# Patient Record
Sex: FEMALE | Race: WHITE | HISPANIC OR LATINO | Employment: STUDENT | URBAN - METROPOLITAN AREA
[De-identification: names, ages, dates, MRNs, and addresses within clinical notes are randomized per-mention and may not be internally consistent; named-entity substitution may affect disease eponyms.]

---
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
## 2023-08-22 ENCOUNTER — OFFICE VISIT (OUTPATIENT)
Dept: URGENT CARE | Facility: CLINIC | Age: 18
End: 2023-08-22
Payer: COMMERCIAL

## 2023-08-22 VITALS
SYSTOLIC BLOOD PRESSURE: 126 MMHG | WEIGHT: 155 LBS | OXYGEN SATURATION: 98 % | BODY MASS INDEX: 24.91 KG/M2 | HEART RATE: 80 BPM | TEMPERATURE: 98 F | DIASTOLIC BLOOD PRESSURE: 80 MMHG | RESPIRATION RATE: 18 BRPM | HEIGHT: 66 IN

## 2023-08-22 DIAGNOSIS — J02.9 SORE THROAT: Primary | ICD-10-CM

## 2023-08-22 DIAGNOSIS — R09.81 NASAL CONGESTION: ICD-10-CM

## 2023-08-22 LAB
CTP QC/QA: YES
SARS-COV-2 AG RESP QL IA.RAPID: NEGATIVE

## 2023-08-22 PROCEDURE — 99213 OFFICE O/P EST LOW 20 MIN: CPT | Mod: S$GLB,,, | Performed by: NURSE PRACTITIONER

## 2023-08-22 PROCEDURE — 87811 SARS-COV-2 COVID19 W/OPTIC: CPT | Mod: QW,S$GLB,, | Performed by: NURSE PRACTITIONER

## 2023-08-22 PROCEDURE — 99213 PR OFFICE/OUTPT VISIT, EST, LEVL III, 20-29 MIN: ICD-10-PCS | Mod: S$GLB,,, | Performed by: NURSE PRACTITIONER

## 2023-08-22 PROCEDURE — 87811 SARS CORONAVIRUS 2 ANTIGEN POCT, MANUAL READ: ICD-10-PCS | Mod: QW,S$GLB,, | Performed by: NURSE PRACTITIONER

## 2023-08-22 RX ORDER — NETARSUDIL 0.2 MG/ML
1 SOLUTION/ DROPS OPHTHALMIC; TOPICAL NIGHTLY
COMMUNITY
Start: 2022-08-29

## 2023-08-22 RX ORDER — DORZOLAMIDE HYDROCHLORIDE AND TIMOLOL MALEATE 20; 5 MG/ML; MG/ML
1 SOLUTION/ DROPS OPHTHALMIC 2 TIMES DAILY
COMMUNITY
Start: 2023-08-08

## 2023-08-22 RX ORDER — DORZOLAMIDE HYDROCHLORIDE AND TIMOLOL MALEATE 20; 5 MG/ML; MG/ML
SOLUTION/ DROPS OPHTHALMIC
COMMUNITY
Start: 2022-09-21

## 2023-08-22 NOTE — PATIENT INSTRUCTIONS
Drink plenty of fluids  Rest.   Elevate head of bed when sleeping, use a humidifier (or a steamy shower) and use normal saline in the nasal passages to help with nasal congestion and cough.   For sore throat- avoid acidic/spicy foods   Gargle with warm salt water  Wear a mask around others may reduce the spread of infections to others  Wash hands frequently or use hand     Medications:  Fever and pain Ibuprofen (Advil or Motrin) and/or Acetaminophen (Tylenol) please read the packages for instructions  Cough  Guaifenesin (Mucinex) is an expectorant, Dextromethropan (DM) is a cough suppressant, or cough syrups of your choice.  If you were prescribed a prescription cough medication today only use as directed.   Congestion Flonase nasal spray. Please use the package for instructions.    Sore throat  Cepacol lozenges, Chloraseptic spray, warm salt water gargles.  The cough may linger for weeks.  Cough is our bodies defense mechanism to move mucus around to prevent us from getting pneumonia.  We can't totally take the cough away.       Follow up if:  Symptoms not improved in 14 days  Fever for longer than 3 days  Cough last longer than 10 days  Increased tiredness or weakness  If you are having difficulty breathing.  (If severe call 911 or go to nearest ER)

## 2023-08-22 NOTE — PROGRESS NOTES
"Subjective:      Patient ID: Ruth Alvarado is a 18 y.o. female.    Vitals:  height is 5' 6" (1.676 m) and weight is 70.3 kg (155 lb).     Chief Complaint: Nasal Congestion    Patient presents a sore throat and body aches that started on Sunday.    Sore Throat   This is a new problem. The current episode started in the past 7 days. There has been no fever. The pain is at a severity of 0/10. Associated symptoms include congestion. Pertinent negatives include no trouble swallowing.       HENT:  Positive for congestion and sore throat. Negative for trouble swallowing.       Pt informed provider that her room mate is positive.  They don't spend much time together but she wanted to be safe and get checked for COVID.   Objective:     Physical Exam   Constitutional: She is oriented to person, place, and time.  Non-toxic appearance. She does not appear ill. No distress.   HENT:   Head: Normocephalic and atraumatic.   Ears:   Right Ear: Tympanic membrane normal.   Left Ear: Tympanic membrane normal.      Comments: Maritza fluid noted behind left TM   Nose: No congestion.   Mouth/Throat: Mucous membranes are moist. Oropharynx is clear.   Eyes: Conjunctivae are normal. Pupils are equal, round, and reactive to light. Extraocular movement intact   Cardiovascular: Normal rate, regular rhythm, normal heart sounds and normal pulses.   Pulmonary/Chest: Effort normal and breath sounds normal. No respiratory distress. She has no wheezes.   Abdominal: Normal appearance. There is no abdominal tenderness.   Musculoskeletal:      Right lower leg: No edema.      Left lower leg: No edema.   Lymphadenopathy:     She has cervical adenopathy.        Right cervical: Superficial cervical adenopathy present.   Neurological: no focal deficit. She is alert and oriented to person, place, and time.   Skin: Skin is not diaphoretic.   Psychiatric: Her behavior is normal. Mood normal.   Nursing note and vitals reviewed.    Pt states that she gets " a lot of ear infections in her left ear.  We will try to drain it first with OTC meds.  Since she is not c/o ear pain at this time we will not treat with antibiotics.   Assessment:     1. Sore throat  - SARS Coronavirus 2 Antigen, POCT Manual Read    2. Nasal congestion  - SARS Coronavirus 2 Antigen, POCT Manual Read     Results for orders placed or performed in visit on 08/22/23   SARS Coronavirus 2 Antigen, POCT Manual Read   Result Value Ref Range    SARS Coronavirus 2 Antigen Negative Negative     Acceptable Yes        Plan:     Patient Instructions   Drink plenty of fluids  Rest.   Elevate head of bed when sleeping, use a humidifier (or a steamy shower) and use normal saline in the nasal passages to help with nasal congestion and cough.   For sore throat- avoid acidic/spicy foods   Gargle with warm salt water  Wear a mask around others may reduce the spread of infections to others  Wash hands frequently or use hand     Medications:  Fever and pain Ibuprofen (Advil or Motrin) and/or Acetaminophen (Tylenol) please read the packages for instructions  Cough  Guaifenesin (Mucinex) is an expectorant, Dextromethropan (DM) is a cough suppressant, or cough syrups of your choice.  If you were prescribed a prescription cough medication today only use as directed.   Congestion Flonase nasal spray. Please use the package for instructions.    Sore throat  Cepacol lozenges, Chloraseptic spray, warm salt water gargles.  The cough may linger for weeks.  Cough is our bodies defense mechanism to move mucus around to prevent us from getting pneumonia.  We can't totally take the cough away.       Follow up if:  Symptoms not improved in 14 days  Fever for longer than 3 days  Cough last longer than 10 days  Increased tiredness or weakness  If you are having difficulty breathing.  (If severe call 911 or go to nearest ER)

## 2023-08-29 ENCOUNTER — OFFICE VISIT (OUTPATIENT)
Dept: URGENT CARE | Facility: CLINIC | Age: 18
End: 2023-08-29
Payer: COMMERCIAL

## 2023-08-29 VITALS
SYSTOLIC BLOOD PRESSURE: 116 MMHG | OXYGEN SATURATION: 98 % | RESPIRATION RATE: 18 BRPM | BODY MASS INDEX: 24.91 KG/M2 | HEIGHT: 66 IN | TEMPERATURE: 99 F | HEART RATE: 73 BPM | WEIGHT: 155 LBS | DIASTOLIC BLOOD PRESSURE: 79 MMHG

## 2023-08-29 DIAGNOSIS — J02.9 SORE THROAT: Primary | ICD-10-CM

## 2023-08-29 DIAGNOSIS — R10.9 PAIN IN ABDOMEN ON PALPATION: ICD-10-CM

## 2023-08-29 DIAGNOSIS — R68.83 CHILLS (WITHOUT FEVER): ICD-10-CM

## 2023-08-29 DIAGNOSIS — R19.7 DIARRHEA, UNSPECIFIED TYPE: ICD-10-CM

## 2023-08-29 DIAGNOSIS — R09.81 NASAL CONGESTION: ICD-10-CM

## 2023-08-29 LAB
CTP QC/QA: YES
SARS-COV-2 AG RESP QL IA.RAPID: NEGATIVE

## 2023-08-29 PROCEDURE — 99213 OFFICE O/P EST LOW 20 MIN: CPT | Mod: S$GLB,,, | Performed by: NURSE PRACTITIONER

## 2023-08-29 PROCEDURE — 87811 SARS-COV-2 COVID19 W/OPTIC: CPT | Mod: QW,S$GLB,, | Performed by: NURSE PRACTITIONER

## 2023-08-29 PROCEDURE — 87811 SARS CORONAVIRUS 2 ANTIGEN POCT, MANUAL READ: ICD-10-PCS | Mod: QW,S$GLB,, | Performed by: NURSE PRACTITIONER

## 2023-08-29 PROCEDURE — 99213 PR OFFICE/OUTPT VISIT, EST, LEVL III, 20-29 MIN: ICD-10-PCS | Mod: S$GLB,,, | Performed by: NURSE PRACTITIONER

## 2023-08-29 NOTE — PROGRESS NOTES
"Subjective:      Patient ID: Ruth Alvarado is a 18 y.o. female.    Vitals:  height is 5' 6" (1.676 m) and weight is 70.3 kg (155 lb). Her temperature is 99.4 °F (37.4 °C). Her blood pressure is 116/79 and her pulse is 73. Her respiration is 18 and oxygen saturation is 98%.     Chief Complaint: Sore Throat    Patient presents sore throat and stomachache that started Saturday. Patient was exposed to covid Thursday.     Sore Throat   This is a new problem. The current episode started in the past 7 days. There has been no fever. Associated symptoms include congestion, diarrhea and headaches. Pertinent negatives include no coughing or trouble swallowing. She has had no exposure to strep. She has tried cool liquids for the symptoms. The treatment provided no relief.     HENT:  Positive for congestion and sore throat. Negative for trouble swallowing.    Respiratory:  Negative for cough.    Gastrointestinal:  Positive for diarrhea.   Neurological:  Positive for headaches.      Pt states her entire friend group has COVID, shared drink with one of them.  Thought she had food poisoning all weekend. Chills most consistent symptom.  Diarrhea stools approx 6 a day since Saturday.   Objective:     Physical Exam   Constitutional: She is oriented to person, place, and time.  Non-toxic appearance. She does not appear ill. No distress.   HENT:   Head: Normocephalic and atraumatic.   Ears:   Right Ear: Tympanic membrane normal.   Left Ear: Tympanic membrane normal.   Nose: No congestion.   Mouth/Throat: Mucous membranes are moist. Oropharynx is clear.   Eyes: Conjunctivae are normal. Pupils are equal, round, and reactive to light. Extraocular movement intact   Cardiovascular: Normal rate, regular rhythm, normal heart sounds and normal pulses.   Pulmonary/Chest: Effort normal and breath sounds normal. No respiratory distress. She has no wheezes.   Abdominal: Normal appearance and bowel sounds are normal. There is abdominal " tenderness. There is no left CVA tenderness and no right CVA tenderness.   Musculoskeletal: Normal range of motion.         General: Normal range of motion.      Right lower leg: No edema.      Left lower leg: No edema.   Lymphadenopathy:     She has cervical adenopathy.        Right cervical: Superficial cervical adenopathy present.        Left cervical: Superficial cervical adenopathy present.   Neurological: no focal deficit. She is alert and oriented to person, place, and time.   Skin: Skin is warm and not diaphoretic.   Psychiatric: Her behavior is normal. Mood normal.   Nursing note and vitals reviewed.    Pt was unable to void for u/a to r/o UTI for abdomen pain and diarrhea    Assessment:     1. Sore throat  - SARS Coronavirus 2 Antigen, POCT Manual Read    2. Chills (without fever)  - SARS Coronavirus 2 Antigen, POCT Manual Read  - POCT Urinalysis, Dipstick, Automated, W/O Scope    3. Diarrhea, unspecified type  - SARS Coronavirus 2 Antigen, POCT Manual Read  - POCT Urinalysis, Dipstick, Automated, W/O Scope    4. Nasal congestion  - SARS Coronavirus 2 Antigen, POCT Manual Read    5. Pain in abdomen on palpation  - POCT Urinalysis, Dipstick, Automated, W/O Scope        Results for orders placed or performed in visit on 08/29/23   SARS Coronavirus 2 Antigen, POCT Manual Read   Result Value Ref Range    SARS Coronavirus 2 Antigen Negative Negative     Acceptable Yes        Plan:     Pt states two more of her friends called while she was in room that they are positive for COVID.  Will treat patient as if she is positive. You are in isolation until the morning of Sept 4th.     Patient Instructions   Drink plenty of fluids  Rest.   If you have fever you may return to work or school when you are fever free for 24 hours without using fever reducing medication.  Elevate head of bed when sleeping, use a humidifier (or a steamy shower) and use normal saline in the nasal passages to help with nasal  congestion and cough.   For sore throat- avoid acidic/spicy foods   Gargle with warm salt water  Wear a mask around others may reduce the spread of infections to others  Wash hands frequently or use hand     Medications:  Fever and pain Ibuprofen (Advil or Motrin) and/or Acetaminophen (Tylenol) please read the packages for instructions  Cough  Guaifenesin (Mucinex) is an expectorant, Dextromethropan (DM) is a cough suppressant, or cough syrups of your choice.  If you were prescribed a prescription cough medication today only use as directed.   Congestion Flonase nasal spray. Please use the package for instructions.  Pseudoephedrine is a decongestant but if you have high blood pressure you can only use Coricidin.  Neti-pot with sterile water.   Sore throat  Cepacol lozenges, Chloraseptic spray, warm salt water gargles  Runny nose/Allergy symptoms  Allegra or Claritin  If you were prescribed an antibiotic today it could take several days before you start to feel better.  The cough may linger for weeks.  Cough is our bodies defense mechanism to move mucus around to prevent us from getting pneumonia.  We can't totally take the cough away.       Follow up if:  Symptoms not improved in 14 days  Fever for longer than 3 days  Cough last longer than 10 days  Increased tiredness or weakness  If you are having difficulty breathing.  (If severe call 911 or go to nearest ER)

## 2023-08-29 NOTE — PATIENT INSTRUCTIONS
Drink plenty of fluids  Rest.   If you have fever you may return to work or school when you are fever free for 24 hours without using fever reducing medication.  Elevate head of bed when sleeping, use a humidifier (or a steamy shower) and use normal saline in the nasal passages to help with nasal congestion and cough.   For sore throat- avoid acidic/spicy foods   Gargle with warm salt water  Wear a mask around others may reduce the spread of infections to others  Wash hands frequently or use hand     Medications:  Fever and pain Ibuprofen (Advil or Motrin) and/or Acetaminophen (Tylenol) please read the packages for instructions  Cough  Guaifenesin (Mucinex) is an expectorant, Dextromethropan (DM) is a cough suppressant, or cough syrups of your choice.  If you were prescribed a prescription cough medication today only use as directed.   Congestion Flonase nasal spray. Please use the package for instructions.  Pseudoephedrine is a decongestant but if you have high blood pressure you can only use Coricidin.  Neti-pot with sterile water.   Sore throat  Cepacol lozenges, Chloraseptic spray, warm salt water gargles  Runny nose/Allergy symptoms  Allegra or Claritin  If you were prescribed an antibiotic today it could take several days before you start to feel better.  The cough may linger for weeks.  Cough is our bodies defense mechanism to move mucus around to prevent us from getting pneumonia.  We can't totally take the cough away.       Follow up if:  Symptoms not improved in 14 days  Fever for longer than 3 days  Cough last longer than 10 days  Increased tiredness or weakness  If you are having difficulty breathing.  (If severe call 911 or go to nearest ER)

## 2023-10-28 ENCOUNTER — HOSPITAL ENCOUNTER (EMERGENCY)
Facility: HOSPITAL | Age: 18
Discharge: HOME | End: 2023-10-28
Attending: EMERGENCY MEDICINE
Payer: COMMERCIAL

## 2023-10-28 VITALS
RESPIRATION RATE: 16 BRPM | HEART RATE: 68 BPM | OXYGEN SATURATION: 99 % | BODY MASS INDEX: 24.91 KG/M2 | DIASTOLIC BLOOD PRESSURE: 78 MMHG | SYSTOLIC BLOOD PRESSURE: 115 MMHG | HEIGHT: 66 IN | WEIGHT: 155 LBS | TEMPERATURE: 99 F
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
## 2023-10-28 DIAGNOSIS — S61.412A LACERATION OF LEFT HAND WITHOUT FOREIGN BODY, INITIAL ENCOUNTER: Primary | ICD-10-CM

## 2023-10-28 PROCEDURE — 99283 EMERGENCY DEPT VISIT LOW MDM: CPT

## 2023-10-28 PROCEDURE — 25000003 PHARM REV CODE 250: Performed by: PHYSICIAN ASSISTANT

## 2023-10-28 PROCEDURE — 12001 RPR S/N/AX/GEN/TRNK 2.5CM/<: CPT

## 2023-10-28 RX ORDER — LIDOCAINE HYDROCHLORIDE 10 MG/ML
10 INJECTION INFILTRATION; PERINEURAL
Status: COMPLETED | OUTPATIENT
Start: 2023-10-28 | End: 2023-10-28

## 2023-10-28 RX ORDER — BACITRACIN ZINC 500 [USP'U]/G
1 OINTMENT TOPICAL
Status: COMPLETED | OUTPATIENT
Start: 2023-10-28 | End: 2023-10-28

## 2023-10-28 RX ADMIN — LIDOCAINE HYDROCHLORIDE 10 ML: 10 INJECTION, SOLUTION INFILTRATION; PERINEURAL at 12:10

## 2023-10-28 RX ADMIN — BACITRACIN 1 EACH: 500 OINTMENT TOPICAL at 12:10

## 2023-10-28 NOTE — ED PROVIDER NOTES
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  Patient states that she was wrestling with a friend and both got "slammed" into the ground.  Patient reports laceration to the L palm and pain to her left hand and wrist.  She believes her tetanus is up-to-date.       Review of patient's allergies indicates:   Allergen Reactions    Acetaminophen Other (See Comments)     Past Medical History:   Diagnosis Date    Arthritis     Retinal tumor of right eye      History reviewed. No pertinent surgical history.  History reviewed. No pertinent family history.  Social History     Tobacco Use    Smoking status: Never    Smokeless tobacco: Never     Review of Systems   Musculoskeletal:  Positive for arthralgias.   Skin:  Positive for wound.       Physical Exam     Initial Vitals [10/28/23 1131]   BP Pulse Resp Temp SpO2   (!) 115/59 82 18 97.6 °F (36.4 °C) 98 %      MAP       --         Physical Exam    Nursing note and vitals reviewed.  Constitutional: She appears well-developed and well-nourished. She is not diaphoretic.  Non-toxic appearance. She does not appear ill. No distress.   HENT:   Head: Normocephalic and atraumatic.   Neck: Neck supple.   Cardiovascular:  Normal rate and regular rhythm.           Pulmonary/Chest: Effort normal. No accessory muscle usage. No tachypnea. No respiratory distress.   Abdominal: She exhibits no distension.   Musculoskeletal:      Cervical back: Neck supple.      Comments: 2cm laceration to the proximal central L palm. Bleeding controlled. There is also mild tenderness to the volar wrist. No swelling or deformity.  Normal sensation to light touch to the hand and digits.  Radial pulse intact.     Neurological: She is alert. "   Skin: No rash noted.   Psychiatric: She has a normal mood and affect. Her behavior is normal.         ED Course   Lac Repair    Date/Time: 10/28/2023 2:32 PM    Performed by: Eloise Clark PA-C  Authorized by: Raphael Raymundo MD    Consent:     Consent obtained:  Verbal    Consent given by:  Patient    Risks discussed:  Infection, pain and poor cosmetic result  Universal protocol:     Patient identity confirmed:  Verbally with patient  Anesthesia:     Anesthesia method:  Local infiltration    Local anesthetic:  Lidocaine 1% w/o epi  Laceration details:     Location:  Hand    Hand location:  L palm    Length (cm):  2    Depth (mm):  3  Pre-procedure details:     Preparation:  Patient was prepped and draped in usual sterile fashion  Exploration:     Imaging obtained: x-ray      Imaging outcome: foreign body not noted    Treatment:     Area cleansed with:  Saline    Amount of cleaning:  Standard    Irrigation solution:  Sterile saline    Irrigation volume:  500cc    Irrigation method:  Pressure wash  Skin repair:     Repair method:  Sutures    Suture size:  4-0    Suture technique:  Simple interrupted    Number of sutures:  5  Approximation:     Approximation:  Close  Repair type:     Repair type:  Simple  Post-procedure details:     Dressing:  Antibiotic ointment and sterile dressing    Procedure completion:  Tolerated well, no immediate complications    Labs Reviewed - No data to display       Imaging Results              X-Ray Hand 3 View Left (Final result)  Result time 10/28/23 13:40:55      Final result by Benjamin Crump MD (10/28/23 13:40:55)                   Impression:      No acute displaced fracture.      Electronically signed by: Benjamin Crump MD  Date:    10/28/2023  Time:    13:40               Narrative:    EXAMINATION:  XR HAND COMPLETE 3 VIEW LEFT    CLINICAL HISTORY:  fall;.    TECHNIQUE:  PA, lateral, and oblique views of the left hand were  performed.    COMPARISON:  None.    FINDINGS:  No acute fracture or dislocation.  Minimal soft tissue edema.  No unexpected radiopaque foreign body.                                       X-Ray Wrist Complete Left (Final result)  Result time 10/28/23 13:04:36      Final result by You Elizabeth MD (10/28/23 13:04:36)                   Impression:      As above.      Electronically signed by: You Elizabeth MD  Date:    10/28/2023  Time:    13:04               Narrative:    EXAMINATION:  XR WRIST COMPLETE 3 VIEWS LEFT    CLINICAL HISTORY:  Unspecified fall, initial encounter    TECHNIQUE:  PA, lateral, and oblique views of the left wrist were performed.    FINDINGS:  There is no fracture, dislocation, or bony erosion.                                       Medications   LIDOcaine HCL 10 mg/ml (1%) injection 10 mL (10 mLs Infiltration Given by Provider 10/28/23 1200)   bacitracin zinc ointment 1 each (1 each Topical (Top) Given by Provider 10/28/23 1200)     Medical Decision Making  18-year-old female presents to the ED with a hand laceration sustained last night. The laceration was repaired as detailed in the procedure note.  Tetanus was up-to-date.  I will d/c patient with wound care instructions and f/u with primary care for suture removal in 7-10 days or return to the ER if unable to f/u with PCP.  Return precautions given.         Amount and/or Complexity of Data Reviewed  Radiology: ordered.    Risk  OTC drugs.  Prescription drug management.                               Clinical Impression:   Final diagnoses:  [W19.XXXA] Fall  [S61.412A] Laceration of left hand without foreign body, initial encounter (Primary)        ED Disposition Condition    Discharge Stable          ED Prescriptions    None       Follow-up Information    None          Eloise Clark PA-C  10/28/23 [id number]

## 2023-10-28 NOTE — DISCHARGE INSTRUCTIONS
Do not remove your bandage for the first 24 hours.  Afterwards, wash gently with soap and water, change dressing daily or more often if soiled or bloody.  You may apply antibiotic ointment, but do not use for more than 2-3 days. Follow up with your primary care provider, any urgent care, or return to the ER in 7-10 days for suture removal and reevaluation of the wound. Return to the ER immediately for any signs of infection including swelling, redness, warmth, or pus draining from the wound; fevers >100.4, or for any new or concerning symptoms.

## 2023-10-28 NOTE — ED NOTES
C/C: 18 y.o. female arrived to the ED via POV for c/o hand pain. Pt reports running around with friends when she suddenly tripped and fell on cement while bracing herself primarily with the left hand. Patient does report socially drinking with friends. Laceration with visible tissue noted to proximal palmar surface of left hand. Pulses intact. Unsure if tetanus is UTD.     APPEARANCE: awake and alert in NAD. Pain score 6/10  SKIN: warm, dry. +laceration with visible tissue to palmar surface of left hand   MUSCULOSKELETAL: Patient moving all extremities spontaneously, no obvious swelling or deformities noted. Ambulates independently.  RESPIRATORY: Denies shortness of breath.Respirations unlabored.   CARDIAC: Denies CP, 2+ distal pulses; no peripheral edema  ABDOMEN: S/ND/NT, + nausea  : voids spontaneously, denies difficulty  Neurologic: AAO x 4; follows commands equal strength in all extremities; denies numbness/tingling.

## 2023-11-03 ENCOUNTER — HOSPITAL ENCOUNTER (EMERGENCY)
Facility: HOSPITAL | Age: 18
Discharge: HOME | End: 2023-11-03
Attending: EMERGENCY MEDICINE
Payer: COMMERCIAL

## 2023-11-03 VITALS
WEIGHT: 160 LBS | TEMPERATURE: 98 F | RESPIRATION RATE: 18 BRPM | SYSTOLIC BLOOD PRESSURE: 139 MMHG | BODY MASS INDEX: 25.71 KG/M2 | HEIGHT: 66 IN | DIASTOLIC BLOOD PRESSURE: 63 MMHG | OXYGEN SATURATION: 97 % | HEART RATE: 85 BPM

## 2023-11-03 DIAGNOSIS — Z51.89 VISIT FOR WOUND CHECK: Primary | ICD-10-CM

## 2023-11-03 PROCEDURE — 99281 EMR DPT VST MAYX REQ PHY/QHP: CPT

## 2023-11-03 NOTE — DISCHARGE INSTRUCTIONS
Your wound does not appear infected today. The center area of your wound does not appear to be fully healed, therefore I do not think your sutures should be removed today. Follow up in the next three days for wound check and suture removal or return to the ER sooner for any new or worsening symptoms.

## 2023-11-03 NOTE — Clinical Note
"Ruth Monterrosozachary Alvarado was seen and treated in our emergency department on 11/3/2023.  She may return to school on 11/04/2023.      If you have any questions or concerns, please don't hesitate to call.      Geneva Flanagan PA-C"

## 2023-11-03 NOTE — Clinical Note
"Ruth Monterrosozachary Alvarado was seen and treated in our emergency department on 11/3/2023.  She may return to school on 11/04/2023.      If you have any questions or concerns, please don't hesitate to call.      Geneva Flnaagan PA-C"

## 2023-11-03 NOTE — ED TRIAGE NOTES
Here for suture removal from left palm - placed here 6 days ago.  No redness or swelling noted.  Sutures intact and  edges remain approximated.

## 2023-11-03 NOTE — Clinical Note
Uzma Dash accompanied their sister(s) to the emergency department on 11/3/2023. They may return to school on 11/03/2023.      If you have any questions or concerns, please don't hesitate to call.      OLIVIA GODOY RN

## 2023-11-03 NOTE — ED PROVIDER NOTES
"Encounter Date: 11/3/2023       History     Chief Complaint   Patient presents with    Suture / Staple Removal     Says she was supposed to get her stitches out Saturday today they are looking "infected"     18-year-old female presents to the emergency department with chief complaint of wound check. She had a fall 6 days ago.  She sustained a laceration to her left hand.  She had sutures placed at this facility.  Today, she noticed some redness around the superior aspect of the wound, which concerned her for infection.  She denies purulence.  No fevers.  She was instructed to come back for suture removal in 7-10 days.  She denies other worsening or alleviating factors.      Review of patient's allergies indicates:   Allergen Reactions    Acetaminophen Other (See Comments)     Past Medical History:   Diagnosis Date    Arthritis     Retinal tumor of right eye      Past Surgical History:   Procedure Laterality Date    EYE SURGERY       History reviewed. No pertinent family history.  Social History     Tobacco Use    Smoking status: Never    Smokeless tobacco: Never   Substance Use Topics    Alcohol use: Yes    Drug use: Yes     Types: Marijuana     Review of Systems   Skin:  Positive for wound.       Physical Exam     Initial Vitals [11/03/23 0943]   BP Pulse Resp Temp SpO2   139/63 85 18 97.8 °F (36.6 °C) 97 %      MAP       --         Physical Exam    Vitals reviewed.  Constitutional: She appears well-developed and well-nourished. She is not diaphoretic. No distress.   HENT:   Head: Normocephalic and atraumatic.   Mouth/Throat: Oropharynx is clear and moist.   Eyes: Conjunctivae and EOM are normal. Pupils are equal, round, and reactive to light.   Neck: Neck supple.   Normal range of motion.  Cardiovascular:  Normal rate.           Pulmonary/Chest: No respiratory distress.   Abdominal: She exhibits no distension.   Musculoskeletal:         General: Normal range of motion.      Cervical back: Normal range of motion " and neck supple.      Comments: 5 sutures noted to the left palm. No evidence of infection. The central portion of the wound does not appear completely healed. See image below.      Neurological: She is alert and oriented to person, place, and time. She has normal strength. No cranial nerve deficit or sensory deficit. GCS score is 15. GCS eye subscore is 4. GCS verbal subscore is 5. GCS motor subscore is 6.   Skin: Skin is warm and dry. Capillary refill takes less than 2 seconds.   Psychiatric: She has a normal mood and affect. Her behavior is normal. Judgment and thought content normal.         ED Course   Procedures  Labs Reviewed - No data to display       Imaging Results    None          Medications - No data to display  Medical Decision Making  Emergent evaluation of a 18 y.o. female presenting to the emergency department with concerned for wound infection. Patient is afebrile, hemodynamically stable, and non toxic appearing.     Differential diagnosis includes but isn't limited to cellulitis, abscess, seroma, hematoma, wound dehiscence.    Patient presenting for wound check to the left hand.  She has 5 sutures in place.  She reports that the most superior aspect of the wound appears more red to her today.  She is concerned about infection.  On physical examination, I do appreciate any evidence of infection.  There is no drainage.  There is no induration.  The wound is well-approximated.  The central portion of the wound does not appear to be fully closed.  There is a slight opening of the skin with gentle traction.  I feel as though her stitches need to stay in for a few more days to ensure appropriate wound healing.  I do not feel that there is any need for oral antibiotics at this time.  Wound care instructions discussed.  Return precautions given.  All questions answered.  The patient was instructed to follow up with a primary care provider or to return to the emergency department for worsening symptoms.  The treatment plan was discussed with the patient who demonstrated understanding and comfort with plan.               Attending Attestation:             Attending ED Notes:   The face-to-face encounter and management were performed solely by the KAYLA.  I was immediately available for consultation, but was not involved in the care of the patient.                        Clinical Impression:   Final diagnoses:  [Z51.89] Visit for wound check (Primary)        ED Disposition Condition    Discharge Stable          ED Prescriptions    None       Follow-up Information       Follow up With Specialties Details Why Contact Info Additional Information    [facility] - Emergency Dept Emergency Medicine Go to  If symptoms worsen [address]  [facility] [address]  [phone]     [facility] B[facility] Internal Medicine Schedule an appointment as soon as possible for a visit in 3 days  [address] [facility] [id number] [phone] [facility] Please park in surface lot and check in at central registration desk             Geneva Flanagan PA-C  11/03/23 1142       Watson Gottlieb MD  11/04/23 0574

## 2023-11-03 NOTE — ED NOTES
Discharge home with family, states understanding to follow up as directed. Ambulates out of ED without difficulty.

## 2023-11-07 ENCOUNTER — OFFICE VISIT (OUTPATIENT)
Dept: URGENT CARE | Facility: CLINIC | Age: 18
End: 2023-11-07
Payer: COMMERCIAL

## 2023-11-07 VITALS
OXYGEN SATURATION: 99 % | BODY MASS INDEX: 25.72 KG/M2 | SYSTOLIC BLOOD PRESSURE: 131 MMHG | RESPIRATION RATE: 19 BRPM | WEIGHT: 160.06 LBS | DIASTOLIC BLOOD PRESSURE: 79 MMHG | HEART RATE: 69 BPM | HEIGHT: 66 IN | TEMPERATURE: 99 F

## 2023-11-07 DIAGNOSIS — Z48.02 VISIT FOR SUTURE REMOVAL: Primary | ICD-10-CM

## 2023-11-07 PROCEDURE — 99024 POSTOP FOLLOW-UP VISIT: CPT | Mod: S$GLB,,, | Performed by: NURSE PRACTITIONER

## 2023-11-07 PROCEDURE — 99024 SUTURE REMOVAL: ICD-10-PCS | Mod: S$GLB,,, | Performed by: NURSE PRACTITIONER

## 2023-11-07 PROCEDURE — 99499 NO LOS: ICD-10-PCS | Mod: S$GLB,,, | Performed by: NURSE PRACTITIONER

## 2023-11-07 PROCEDURE — 99499 UNLISTED E&M SERVICE: CPT | Mod: S$GLB,,, | Performed by: NURSE PRACTITIONER

## 2023-11-07 NOTE — PROCEDURES
Suture Removal    Date/Time: 11/7/2023 11:45 AM  Location procedure was performed: [facility]    Performed by: Kay Upton NP  Authorized by: Kay Upton NP  Assisting provider: Kay Upton NP  Pre-operative diagnosis: laceration hand  Post-operative diagnosis: suture removal  Body area: upper extremity  Location details: left hand  Description of findings: well approximated healing laceration with 5 sutures   Wound Appearance: clean, well healed, normal color and no drainage  Sutures Removed: 5  Post-removal: no dressing applied  Technical procedures used: suture removal kit  Significant surgical tasks conducted by the assistant(s): none  Complications: No  Estimated blood loss (mL): 0  Specimens: No  Implants: No  Patient tolerance: Patient tolerated the procedure well with no immediate complications  Comments: Pt had her sutures placed in [facility] 10 days ago.  Was playing football on concrete and fell palms down lacerating her left palm requiring 5 sutures.

## 2023-11-07 NOTE — PROGRESS NOTES
Subjective:      Patient ID: Ruth Alvarado is a 18 y.o. female.    Vitals:  vitals were not taken for this visit.     Chief Complaint: Suture / Staple Removal    Patient states she needs sutures removed from left hand.     Suture / Staple Removal  The sutures were placed 7 to 10 days ago. She tried regular soap and water washings since the wound repair. The treatment provided significant relief. Her temperature was unmeasured prior to arrival. The temperature was taken using an oral thermometer. There has been no drainage from the wound. There is no redness present. There is no swelling present. There is no pain present. She has no difficulty moving the affected extremity or digit.   ROS   Objective:     Physical Exam    Assessment:     No diagnosis found.    Plan:       There are no diagnoses linked to this encounter.

## 2025-02-07 ENCOUNTER — HOSPITAL ENCOUNTER (EMERGENCY)
Facility: OTHER | Age: 20
Discharge: HOME | End: 2025-02-08
Attending: STUDENT IN AN ORGANIZED HEALTH CARE EDUCATION/TRAINING PROGRAM
Payer: COMMERCIAL

## 2025-02-07 DIAGNOSIS — T26.91XA CHEMICAL INJURY OF EYE, RIGHT, INITIAL ENCOUNTER: Primary | ICD-10-CM

## 2025-02-07 DIAGNOSIS — S05.8X1A ABRASION OF SCLERA OF RIGHT EYE, INITIAL ENCOUNTER: ICD-10-CM

## 2025-02-07 PROCEDURE — 25000003 PHARM REV CODE 250

## 2025-02-07 PROCEDURE — 99283 EMERGENCY DEPT VISIT LOW MDM: CPT

## 2025-02-07 RX ORDER — ERYTHROMYCIN 5 MG/G
OINTMENT OPHTHALMIC
Status: DISCONTINUED | OUTPATIENT
Start: 2025-02-07 | End: 2025-02-07

## 2025-02-07 RX ORDER — ERYTHROMYCIN 5 MG/G
OINTMENT OPHTHALMIC
Qty: 3.5 G | Refills: 0 | Status: SHIPPED | OUTPATIENT
Start: 2025-02-07

## 2025-02-07 RX ORDER — PROPARACAINE HYDROCHLORIDE 5 MG/ML
1 SOLUTION/ DROPS OPHTHALMIC
Status: COMPLETED | OUTPATIENT
Start: 2025-02-07 | End: 2025-02-07

## 2025-02-07 RX ORDER — ERYTHROMYCIN 5 MG/G
OINTMENT OPHTHALMIC
Status: COMPLETED | OUTPATIENT
Start: 2025-02-08 | End: 2025-02-07

## 2025-02-07 RX ADMIN — PROPARACAINE HYDROCHLORIDE 1 DROP: 5 SOLUTION/ DROPS OPHTHALMIC at 11:02

## 2025-02-07 RX ADMIN — ERYTHROMYCIN: 5 OINTMENT OPHTHALMIC at 11:02

## 2025-02-07 RX ADMIN — FLUORESCEIN SODIUM 1 EACH: 1 STRIP OPHTHALMIC at 11:02

## 2025-02-08 VITALS
TEMPERATURE: 98 F | BODY MASS INDEX: 23.3 KG/M2 | HEART RATE: 79 BPM | WEIGHT: 145 LBS | OXYGEN SATURATION: 98 % | RESPIRATION RATE: 15 BRPM | SYSTOLIC BLOOD PRESSURE: 124 MMHG | HEIGHT: 66 IN | DIASTOLIC BLOOD PRESSURE: 78 MMHG

## 2025-02-08 PROCEDURE — 25000003 PHARM REV CODE 250: Performed by: STUDENT IN AN ORGANIZED HEALTH CARE EDUCATION/TRAINING PROGRAM

## 2025-02-08 NOTE — ED PROVIDER NOTES
"Encounter Date: 2/7/2025       History     Chief Complaint   Patient presents with    Eye Injury     Splashed michele  into R eye x5 hours ago. Endorses blurry vision, gray floater, sharp pain. Flushed eye for 45 mins with saline/distilled water. Pt has extensive eye hx.       19-year-old female with a past medical history of right eye tumor s/p ocular surgery complicated by chronic glaucoma on timolol drops now presents with a chief complaint of chemical splash in right eye.  Reports that she was working with pool  when she splashed "Off the Wall" into her right eye and immediately flushed her eye for 30 minutes and presented to the emergency department. Patient reports that she has blurry vision in her right eye with slightly increase in floaters (has floaters at baseline).  Patient reports pain over the outer lower quadrant of her right eye. Patient denies any other injuries.  Of note patient has plan for ocular surgery in one-week and has established ophthalmology follow-up and is in communication with our ophthalmologist.    The history is provided by the patient.     Review of patient's allergies indicates:   Allergen Reactions    Acetaminophen Other (See Comments)     Reccommended not to take.         Past Medical History:   Diagnosis Date    Arthritis     Retinal tumor of right eye      Past Surgical History:   Procedure Laterality Date    EYE SURGERY       No family history on file.  Social History     Tobacco Use    Smoking status: Never    Smokeless tobacco: Never   Substance Use Topics    Alcohol use: Yes    Drug use: Yes     Types: Marijuana     Review of Systems  See HPI     Physical Exam     Initial Vitals [02/07/25 2247]   BP Pulse Resp Temp SpO2   126/74 84 16 98.1 °F (36.7 °C) 96 %      MAP       --         Physical Exam    Nursing note and vitals reviewed.      Gen: AxOx3, well nourished, appears stated age, no pallor, no jaundice, appears well hydrated  Eye: EOMI, no scleral " icterus, no periorbital edema or ecchymosis  O Dextra  - pH 7  - visual acuity 20/40  - mild periorbital swelling  - no tearing/ discharge visualized  - generalised scleral injection  - pupil round and reactive to light  - extraocular movements intact  - fluorescein uptake noted over sclera at 4 o'clock position  - pressure 19      O Sinistra  - visual acuity 20/25  - no periorbital swelling  - no tearing / discharge visualized  - no scleral injection  - pupil round and reactive to light  - extraocular movements intact  - pressure 15    Head: Normocephalic, atraumatic, no lesions, scalp appears normal  ENT: Neck supple, no stridor, no masses, no drooling or voice changes  CVS: All distal pulses intact with normal rate and rhythm, no JVD  Pulm: No increased work of breathing  Abd:  Nondistended, soft, nontender, no organomegaly, no CVAT  Ext: No edema, no lesions, rashes, or deformity  Neuro: GCS15, moving all extremities, gait intact, face grossly symmetric  Psych: normal affect, cooperative, well groomed, makes good eye contact      ED Course   Procedures  Labs Reviewed - No data to display       Imaging Results    None          Medications   fluorescein ophthalmic strip 1 each (1 each Right Eye Given 2/7/25 0167)   proparacaine 0.5 % ophthalmic solution 1 drop (1 drop Right Eye Given 2/7/25 5107)   erythromycin 5 mg/gram (0.5 %) ophthalmic ointment ( Right Eye Given 2/7/25 9082)     Medical Decision Making  Initial assessment  19-year-old female with a past medical history of right eye tumor s/p ocular surgery complicated by chronic glaucoma on timolol drops now presents with a chief complaint of chemical splash in right eye. Patient is able to vocalise, breathing spontaneously, hemodynamically stable, oriented, moving all 4 limbs spontaneously.  Examination consistent with scleral abrasion of right eye.      Differential diagnosis  Chemical injury of eye including abrasion  Acute glaucoma  Globe  injury/rupture  Considered other ocular emergencies including retinal detachment but very low suspicion      ED management  On arrival patient was stable and comfortable and had already flushed drive for 30 minutes at home.  Patient was taken to eye wash station and she flushed her eye for an additional 15 minutes and pH was taken which resulted as 7.  Visual acuity was grossly baseline.  Examination was consistent with scleral abrasion and I had low suspicion for acute angle closure glaucoma or other ophthalmological emergencies given reassuring pressure is and visual acuity.  Patient was given erythromycin ointment and referred to Ophthalmology.  Patient has close follow up with her ophthalmologist in [place] and she will follow up with them within the coming week.      Risk  Prescription drug management.                                      Clinical Impression:  Final diagnoses:  [T26.91XA] Chemical injury of eye, right, initial encounter (Primary)  [S05.8X1A] Abrasion of sclera of right eye, initial encounter          ED Disposition Condition    Discharge Stable          ED Prescriptions       Medication Sig Dispense Start Date End Date Auth. Provider    erythromycin (ROMYCIN) ophthalmic ointment Place a 1/2 inch ribbon of ointment into the lower eyelid 4 times daily for 5 days. 3.5 g 2/7/2025 -- Danica Black MD          Follow-up Information       Follow up With Specialties Details Why Contact Info Additional Information    [facility] - Emergency Dept Emergency Medicine   [address] [facility] [address] [phone]     Your Primary Care Doctor  Schedule an appointment as soon as possible for a visit in 3 days       [address] Ophthalmology   [address] [facility] [address] [phone] Please arrive on the 10th floor for check-in.             Danica Black MD  Resident  02/08/25 0010

## 2025-02-08 NOTE — ED TRIAGE NOTES
"19y F presents to the ER w c/o blurry vision in R eye after getting "off the wall"  in eye. Pt reports flushing the eye for 45min. Pt reports having an eye tumor that is getting operated on next week and ophthalmologist wanted her to get checked out. AAOx4  "

## 2025-02-10 ENCOUNTER — TELEPHONE (OUTPATIENT)
Dept: OPHTHALMOLOGY | Facility: CLINIC | Age: 20
End: 2025-02-10
Payer: COMMERCIAL

## 2025-02-10 NOTE — TELEPHONE ENCOUNTER
----- Message from Wander Mora sent at 2/10/2025  3:42 PM CST -----  Regarding: Follow Up  Hi,    This pt was seen by ED provider at [facility] and provider is requesting f/u with [facility] ophthalmology. Based on hx pt appears to follow with outside ophthalmologist. If pt does not have outside opthalmology follow up and wishes to be seen at [facility] ophthalmology, please schedule in optometry clinic in the next 1 week.     Thank you,    Wander Mora MD  PGY-2